# Patient Record
Sex: MALE | Race: WHITE | Employment: OTHER | URBAN - NONMETROPOLITAN AREA
[De-identification: names, ages, dates, MRNs, and addresses within clinical notes are randomized per-mention and may not be internally consistent; named-entity substitution may affect disease eponyms.]

---

## 2021-03-29 ENCOUNTER — TELEPHONE (OUTPATIENT)
Dept: FAMILY MEDICINE CLINIC | Age: 86
End: 2021-03-29

## 2021-03-29 NOTE — TELEPHONE ENCOUNTER
Wife left message on my voicemail patient needs medication memantine 10 mg daily she wants it sent right away. I called [facility] I do not have any medications or pharmacy listed for the patient and Hermila Greenwood stated she will take care of it - they refill the patients medication.

## 2022-12-23 ENCOUNTER — OUTSIDE SERVICES (OUTPATIENT)
Dept: FAMILY MEDICINE CLINIC | Age: 87
End: 2022-12-23

## 2022-12-23 DIAGNOSIS — J18.9 PNEUMONIA OF BOTH LUNGS DUE TO INFECTIOUS ORGANISM, UNSPECIFIED PART OF LUNG: Primary | ICD-10-CM

## 2022-12-23 DIAGNOSIS — E03.9 HYPOTHYROIDISM, UNSPECIFIED TYPE: ICD-10-CM

## 2022-12-23 DIAGNOSIS — I48.0 PAROXYSMAL ATRIAL FIBRILLATION (HCC): ICD-10-CM

## 2022-12-23 DIAGNOSIS — R13.12 OROPHARYNGEAL DYSPHAGIA: ICD-10-CM

## 2022-12-23 DIAGNOSIS — R41.81 AGE-RELATED COGNITIVE DECLINE: ICD-10-CM

## 2022-12-30 PROBLEM — I48.0 PAROXYSMAL ATRIAL FIBRILLATION (HCC): Status: ACTIVE | Noted: 2022-12-30

## 2022-12-30 PROBLEM — E03.9 HYPOTHYROIDISM: Status: ACTIVE | Noted: 2022-12-30

## 2022-12-30 PROBLEM — R41.81 AGE-RELATED COGNITIVE DECLINE: Status: ACTIVE | Noted: 2022-12-30

## 2022-12-30 PROBLEM — R13.12 OROPHARYNGEAL DYSPHAGIA: Status: ACTIVE | Noted: 2022-12-30

## 2022-12-30 PROBLEM — J18.9 PNEUMONIA OF BOTH LUNGS DUE TO INFECTIOUS ORGANISM: Status: ACTIVE | Noted: 2022-12-30

## 2023-04-28 ENCOUNTER — OUTSIDE SERVICES (OUTPATIENT)
Dept: FAMILY MEDICINE CLINIC | Age: 88
End: 2023-04-28

## 2023-04-28 DIAGNOSIS — I48.0 PAROXYSMAL ATRIAL FIBRILLATION (HCC): ICD-10-CM

## 2023-04-28 DIAGNOSIS — N32.81 OVERACTIVE BLADDER: ICD-10-CM

## 2023-04-28 DIAGNOSIS — N18.9 CHRONIC KIDNEY DISEASE, UNSPECIFIED CKD STAGE: ICD-10-CM

## 2023-04-28 DIAGNOSIS — Z53.20 ASSESSMENT OF PHYSICAL HEALTH DECLINED: Primary | ICD-10-CM

## 2023-04-28 DIAGNOSIS — I95.1 ORTHOSTATIC HYPOTENSION: ICD-10-CM

## 2023-04-28 NOTE — PROGRESS NOTES
4/28/2023    David Lunsford  9/29/1929    This resident is being seen today for a follow-up evaluation visit. He is a resident who has long-term medical conditions including oropharyngeal dysphagia, weight loss, debilitation, paroxysmal atrial fibrillation, hypothyroidism, chronic kidney disease, overactive bladder, orthostatic hypotension, chronic blepharitis. He is a 80 y.o. male resident who is being seen today for a follow-up evaluation with resident seen in conjunction with hospice services. He does remain bed to chair confined and is essentially nonverbal.  He is extremely stiff all over which makes movements very difficult. His hospice needs were present on today's evaluation and indicated that his penis was reddened and they did apply some antifungal and they will continue to monitor. He is not coughing or having any shortness of breath during my evaluation. He furthermore had no evidence of nausea or vomiting, bowel changes, fever or chills, falls or syncopal events. Medications:  Midodrine 10 mg 3 times daily  Levothyroxine 88 mcg daily  Fiber-Lax 625 mg with 3 tablets twice daily  Senna 8.6-50 mg twice daily  Claritin 10 mg at bedtime  Carbamoxide eardrops 2 drops monthly  Aspirin 81 mg daily      Objective     Vital Signs: Stable discussed with nursing    Physical examination:Skin is essentially warm and dry. HEENT unremarkable. Neck is supple. Heart regular rate and rhythm. No rubs, gallops or murmurs noted. Lungs are clear to auscultation. No evidence of rhonchi, rales, or wheezing. Abdomen is soft, supple and non-tender. Bowel sounds are noted x4 quadrants. No rigidity, guarding or rebound tenderness. Negative Valencia's, negative McBurney's, negative Ronny's. Extremities; residual venous insufficiency with no true pitting edema. Pulses are adequate. No clubbing  or no cyanosis noted.   He does have chronic motion restriction to the shoulders, hips and knees and is relatively stiff

## 2023-05-30 ENCOUNTER — OUTSIDE SERVICES (OUTPATIENT)
Dept: FAMILY MEDICINE CLINIC | Age: 88
End: 2023-05-30

## 2023-05-30 DIAGNOSIS — R13.12 OROPHARYNGEAL DYSPHAGIA: ICD-10-CM

## 2023-05-30 DIAGNOSIS — I48.0 PAROXYSMAL ATRIAL FIBRILLATION (HCC): ICD-10-CM

## 2023-05-30 DIAGNOSIS — E03.9 HYPOTHYROIDISM, UNSPECIFIED TYPE: ICD-10-CM

## 2023-05-30 DIAGNOSIS — R41.81 AGE-RELATED COGNITIVE DECLINE: Primary | ICD-10-CM

## 2023-05-30 DIAGNOSIS — N18.9 CHRONIC KIDNEY DISEASE, UNSPECIFIED CKD STAGE: ICD-10-CM

## 2023-05-30 NOTE — PROGRESS NOTES
5/30/2023    Iván Susieguillermo  9/29/1929    This resident is being seen today for a follow-up evaluation visit. He is a resident who has long-term medical conditions including oropharyngeal dysphagia, weight loss, debilitation, paroxysmal atrial fibrillation, hypothyroidism, chronic kidney disease, overactive bladder, orthostatic hypotension, chronic blepharitis. He is a 80 y.o. male resident who is being seen today for a follow-up visit with this resident for chair confined and transfers by way of [place] [facility]. He continues with the benefit of hospice services and does remain nonverbal.  He has a regular diet with nectar thickened liquids and recent discontinuation of Pro-Stat with fortified foods. He indicates that he does have some ongoing discomfort to his bilateral shoulders. He denies any headaches or dizziness, sore throat, chest pain, coughing or shortness of breath, nausea or vomiting, constipation or diarrhea, fever or chills, falls or syncopal event. Medications:  Midodrine 10 mg 3 times daily  Levothyroxine 88 mcg daily  Fiber-Lax 625 mg with 3 tablets twice daily  Senna 8.6-50 mg twice daily  Claritin 10 mg at bedtime  Carbamoxide eardrops 2 drops monthly  Aspirin 81 mg daily      Objective     Vital Signs: /74 pulse 75 respirations 18 temperature 97.6 O2 92% on room air        Physical examination:Skin is essentially warm and dry. HEENT unremarkable. Neck is supple. Heart regular rate and rhythm. No rubs, gallops or murmurs noted. Lungs are clear to auscultation. No evidence of rhonchi, rales, or wheezing. Abdomen is soft, supple and non-tender. Bowel sounds are noted x4 quadrants. No rigidity, guarding or rebound tenderness. Negative Valencia's, negative McBurney's, negative Ronny's. Extremities; residual venous insufficiency with no true pitting edema. Pulses are adequate. No clubbing  or no cyanosis noted.   He does have chronic motion restriction to the shoulders, hips and

## 2023-06-27 ENCOUNTER — OUTSIDE SERVICES (OUTPATIENT)
Dept: FAMILY MEDICINE CLINIC | Age: 88
End: 2023-06-27

## 2023-06-27 DIAGNOSIS — I48.0 PAROXYSMAL ATRIAL FIBRILLATION (HCC): Primary | ICD-10-CM

## 2023-06-27 DIAGNOSIS — F03.90 DEMENTIA, UNSPECIFIED DEMENTIA SEVERITY, UNSPECIFIED DEMENTIA TYPE, UNSPECIFIED WHETHER BEHAVIORAL, PSYCHOTIC, OR MOOD DISTURBANCE OR ANXIETY (HCC): ICD-10-CM

## 2023-06-27 DIAGNOSIS — E03.9 HYPOTHYROIDISM, UNSPECIFIED TYPE: ICD-10-CM

## 2023-06-27 DIAGNOSIS — N32.81 OVERACTIVE BLADDER: ICD-10-CM

## 2023-06-27 DIAGNOSIS — N18.9 CHRONIC KIDNEY DISEASE, UNSPECIFIED CKD STAGE: ICD-10-CM

## 2023-10-30 ENCOUNTER — OUTSIDE SERVICES (OUTPATIENT)
Dept: FAMILY MEDICINE CLINIC | Age: 88
End: 2023-10-30

## 2023-10-30 DIAGNOSIS — N18.9 CHRONIC KIDNEY DISEASE, UNSPECIFIED CKD STAGE: ICD-10-CM

## 2023-10-30 DIAGNOSIS — Z20.822 CLOSE EXPOSURE TO COVID-19 VIRUS: Primary | ICD-10-CM

## 2023-10-30 DIAGNOSIS — I10 PRIMARY HYPERTENSION: ICD-10-CM

## 2023-10-30 DIAGNOSIS — N32.81 OVERACTIVE BLADDER: ICD-10-CM

## 2023-10-30 DIAGNOSIS — E03.9 HYPOTHYROIDISM, UNSPECIFIED TYPE: ICD-10-CM

## 2023-10-30 NOTE — PROGRESS NOTES
10/30/2023    Pervis Rock  9/29/1929    This resident is being seen today for a follow-up evaluation visit. He is a resident who has long-term medical conditions including oropharyngeal dysphagia, weight loss, debilitation, paroxysmal atrial fibrillation, hypothyroidism, chronic kidney disease, overactive bladder, orthostatic hypotension, chronic blepharitis, dementia. He is a 80 y.o. male resident who is being seen today for a follow-up visit with this resident under recent assessment for apparently rolling out of his bed during patient care on October 13. He does have a bruise intact to his right cheek. He is essentially nonverbal but staff states that he will sometimes answer yes/no questions. He was in no acute distress during my evaluation with no complaints of pain, sore throat, chest pain, coughing or shortness of breath, nausea or vomiting, constipation or diarrhea, fever or chills, falls or syncopal events. Medications:  Levothyroxine 88 mcg daily  Fiber-Lax 625 mg with 3 tablets twice daily  Senna 8.6-50 mg twice daily  Claritin 10 mg at bedtime  Carbamoxide eardrops 2 drops monthly  Aspirin 81 mg daily  Tramadol 50 mg twice daily  Tramadol 50 mg every 6 hours as needed  Vitamin C 500 mg daily  Zinc 50 mg twice daily  Vitamin D3 5000 units daily  Ativan 0.5 mg every 4 hours as needed          Objective     Vital Signs: /77 pulse 88 respirations 20 temperature 99.4 O2 92% on room air        Physical examination:Skin is essentially warm and dry. Residual bruise noted to the right cheek. HEENT unremarkable. Neck is supple. Heart regular rate and rhythm. No rubs, gallops or murmurs noted. Lungs are clear to auscultation. No evidence of rhonchi, rales, or wheezing. Abdomen is soft, supple and non-tender. Bowel sounds are noted x4 quadrants. No rigidity, guarding or rebound tenderness. Negative Valencia's, negative McBurney's, negative Ronny's.   Extremities; residual venous

## 2024-01-04 ENCOUNTER — OUTSIDE SERVICES (OUTPATIENT)
Dept: FAMILY MEDICINE CLINIC | Age: 89
End: 2024-01-04

## 2024-01-04 DIAGNOSIS — R41.81 AGE-RELATED COGNITIVE DECLINE: ICD-10-CM

## 2024-01-04 DIAGNOSIS — E03.9 HYPOTHYROIDISM, UNSPECIFIED TYPE: ICD-10-CM

## 2024-01-04 DIAGNOSIS — U07.1 COVID: Primary | ICD-10-CM

## 2024-01-04 DIAGNOSIS — N18.9 CHRONIC KIDNEY DISEASE, UNSPECIFIED CKD STAGE: ICD-10-CM

## 2024-01-04 DIAGNOSIS — I48.0 PAROXYSMAL ATRIAL FIBRILLATION (HCC): ICD-10-CM

## 2024-01-05 NOTE — PROGRESS NOTES
have chronic motion restriction to the shoulders, hips and knees and is relatively stiff all over.  Neurologically he  is alert and oriented x1.  No evidence of paralysis or paresthesias noted.    Diagnoses and all orders for this visit:    COVID  Comments:  Maintain Medrol Dosepak with a Z-Jose Daniel    Chronic kidney disease, unspecified CKD stage  Comments:  Continue with close observation of GFR    Age-related cognitive decline  Comments:  Stable with observation does maintain low-dose aspirin    Hypothyroidism, unspecified type  Comments:  Controlled with levothyroxine    Paroxysmal atrial fibrillation (HCC)  Comments:  Stable with low-dose aspirin                    Plan:  Plan of care was discussed with the healthcare team with medications and labs reviewed.  As stated, this resident was recently diagnosed with COVID and staff indicated that he did have a fever but otherwise has been asymptomatic in this regard.  He does continue with treatment in terms of a Z-Jose Daniel and Medrol Dosepak and continues with close observation regarding any symptomatology and I will maintain recommendations to see him routinely and as needed with further orders forthcoming.      AKASH STEPHENSON, APRN - CNP      *Note was creating using voice recognition software.  The document was reviewed however grammatical errors may exist.

## 2024-06-20 ENCOUNTER — OUTSIDE SERVICES (OUTPATIENT)
Dept: FAMILY MEDICINE CLINIC | Age: 89
End: 2024-06-20
Payer: MEDICARE

## 2024-06-20 DIAGNOSIS — E03.9 HYPOTHYROIDISM, UNSPECIFIED TYPE: ICD-10-CM

## 2024-06-20 DIAGNOSIS — R41.81 AGE-RELATED COGNITIVE DECLINE: Primary | ICD-10-CM

## 2024-06-20 DIAGNOSIS — F03.90 DEMENTIA, UNSPECIFIED DEMENTIA SEVERITY, UNSPECIFIED DEMENTIA TYPE, UNSPECIFIED WHETHER BEHAVIORAL, PSYCHOTIC, OR MOOD DISTURBANCE OR ANXIETY (HCC): ICD-10-CM

## 2024-06-20 DIAGNOSIS — I48.0 PAROXYSMAL ATRIAL FIBRILLATION (HCC): ICD-10-CM

## 2024-06-20 DIAGNOSIS — I95.1 ORTHOSTATIC HYPOTENSION: ICD-10-CM

## 2024-06-20 PROCEDURE — 99308 SBSQ NF CARE LOW MDM 20: CPT | Performed by: FAMILY MEDICINE

## 2024-06-27 PROBLEM — F03.90 DEMENTIA, UNSPECIFIED DEMENTIA SEVERITY, UNSPECIFIED DEMENTIA TYPE, UNSPECIFIED WHETHER BEHAVIORAL, PSYCHOTIC, OR MOOD DISTURBANCE OR ANXIETY (HCC): Status: ACTIVE | Noted: 2024-06-27
